# Patient Record
(demographics unavailable — no encounter records)

---

## 2024-11-13 NOTE — ASSESSMENT
[FreeTextEntry1] : 23 M with cervical and lumbar pain PT, meds  Will discuss MRI NSAIDs- Patient warned of risk of medication to GI tract, increased blood pressure, cardiac risk, and risk of fluid retention.  Advised to clear medication with internist or PCP if any concurrent health problem with heart, blood pressure, or GI system exists.

## 2024-11-13 NOTE — HISTORY OF PRESENT ILLNESS
[de-identified] : 11/13/24: 23 M is here for neck and lumbar pain that began about 3 weeks ago without inciting injury. He was seen at [facility] where xrays were taken. Patient brought reports with him. He has pain that radiates down into his right foot. Denies numbness, tingling, prior injury. He works for [employer].  [] : no

## 2024-11-13 NOTE — IMAGING
[de-identified] : CSPINE Inspection: No rash or ecchymosis Palpation: no spasm and TTP in traps, rhomboids, paracervicals ROM: full without pain Strength: 5/5 bilateral deltoid, biceps, triceps, wrist flexors, wrist extensors, , abductors Sensation: Sensation present to light touch bilateral C5-T1 distributions Reflexes: Negative Chow's bilaterally  LSPINE Inspection: No rash or ecchymosis Palpation: right sided paraspinal spasm and tenderness ROM: Full with no pain Strength: 5/5 bilateral hip flexors, knee extensors, ankle dorsiflexors, EHL, ankle plantarflexors Sensation: Sensation present to light touch bilateral L2-S1 distributions Provocative maneuvers: Negative bilateral straight leg raise